# Patient Record
Sex: FEMALE | Race: WHITE | Employment: FULL TIME | URBAN - METROPOLITAN AREA
[De-identification: names, ages, dates, MRNs, and addresses within clinical notes are randomized per-mention and may not be internally consistent; named-entity substitution may affect disease eponyms.]

---

## 2018-10-19 ENCOUNTER — HOSPITAL ENCOUNTER (EMERGENCY)
Age: 63
Discharge: HOME | End: 2018-10-19
Attending: EMERGENCY MEDICINE
Payer: COMMERCIAL

## 2018-10-19 ENCOUNTER — APPOINTMENT (OUTPATIENT)
Dept: CT IMAGING | Age: 63
End: 2018-10-19
Attending: EMERGENCY MEDICINE
Payer: COMMERCIAL

## 2018-10-19 VITALS
WEIGHT: 115.2 LBS | DIASTOLIC BLOOD PRESSURE: 86 MMHG | HEART RATE: 88 BPM | RESPIRATION RATE: 16 BRPM | HEIGHT: 60 IN | OXYGEN SATURATION: 98 % | SYSTOLIC BLOOD PRESSURE: 128 MMHG | TEMPERATURE: 98.1 F | BODY MASS INDEX: 22.62 KG/M2

## 2018-10-19 DIAGNOSIS — M79.602 PAIN OF LEFT UPPER EXTREMITY: ICD-10-CM

## 2018-10-19 DIAGNOSIS — M54.2 NECK PAIN: Primary | ICD-10-CM

## 2018-10-19 DIAGNOSIS — R10.9 SIDE PAIN: ICD-10-CM

## 2018-10-19 LAB
ALBUMIN SERPL-MCNC: 3.8 G/DL (ref 3.5–5)
ALBUMIN/GLOB SERPL: 0.8 {RATIO} (ref 1.1–2.2)
ALP SERPL-CCNC: 92 U/L (ref 45–117)
ALT SERPL-CCNC: 37 U/L (ref 12–78)
ANION GAP SERPL CALC-SCNC: 8 MMOL/L (ref 5–15)
AST SERPL-CCNC: 27 U/L (ref 15–37)
BASOPHILS # BLD: 0 K/UL (ref 0–0.1)
BASOPHILS NFR BLD: 1 % (ref 0–1)
BILIRUB SERPL-MCNC: 0.3 MG/DL (ref 0.2–1)
BUN SERPL-MCNC: 11 MG/DL (ref 6–20)
BUN/CREAT SERPL: 14 (ref 12–20)
CALCIUM SERPL-MCNC: 9 MG/DL (ref 8.5–10.1)
CHLORIDE SERPL-SCNC: 104 MMOL/L (ref 97–108)
CO2 SERPL-SCNC: 27 MMOL/L (ref 21–32)
COMMENT, HOLDF: NORMAL
CREAT SERPL-MCNC: 0.77 MG/DL (ref 0.55–1.02)
DIFFERENTIAL METHOD BLD: ABNORMAL
EOSINOPHIL # BLD: 0.1 K/UL (ref 0–0.4)
EOSINOPHIL NFR BLD: 4 % (ref 0–7)
ERYTHROCYTE [DISTWIDTH] IN BLOOD BY AUTOMATED COUNT: 13.8 % (ref 11.5–14.5)
GLOBULIN SER CALC-MCNC: 5 G/DL (ref 2–4)
GLUCOSE SERPL-MCNC: 118 MG/DL (ref 65–100)
HCT VFR BLD AUTO: 33 % (ref 35–47)
HGB BLD-MCNC: 11.2 G/DL (ref 11.5–16)
IMM GRANULOCYTES # BLD: 0 K/UL (ref 0–0.04)
IMM GRANULOCYTES NFR BLD AUTO: 0 % (ref 0–0.5)
LYMPHOCYTES # BLD: 0.9 K/UL (ref 0.8–3.5)
LYMPHOCYTES NFR BLD: 26 % (ref 12–49)
MCH RBC QN AUTO: 28.7 PG (ref 26–34)
MCHC RBC AUTO-ENTMCNC: 33.9 G/DL (ref 30–36.5)
MCV RBC AUTO: 84.6 FL (ref 80–99)
MONOCYTES # BLD: 0.2 K/UL (ref 0–1)
MONOCYTES NFR BLD: 6 % (ref 5–13)
NEUTS SEG # BLD: 2.3 K/UL (ref 1.8–8)
NEUTS SEG NFR BLD: 63 % (ref 32–75)
NRBC # BLD: 0 K/UL (ref 0–0.01)
NRBC BLD-RTO: 0 PER 100 WBC
PLATELET # BLD AUTO: 151 K/UL (ref 150–400)
PMV BLD AUTO: 11.9 FL (ref 8.9–12.9)
POTASSIUM SERPL-SCNC: 3.9 MMOL/L (ref 3.5–5.1)
PROT SERPL-MCNC: 8.8 G/DL (ref 6.4–8.2)
RBC # BLD AUTO: 3.9 M/UL (ref 3.8–5.2)
SAMPLES BEING HELD,HOLD: NORMAL
SODIUM SERPL-SCNC: 139 MMOL/L (ref 136–145)
TROPONIN I SERPL-MCNC: <0.05 NG/ML
WBC # BLD AUTO: 3.6 K/UL (ref 3.6–11)

## 2018-10-19 PROCEDURE — 71275 CT ANGIOGRAPHY CHEST: CPT

## 2018-10-19 PROCEDURE — 84484 ASSAY OF TROPONIN QUANT: CPT | Performed by: EMERGENCY MEDICINE

## 2018-10-19 PROCEDURE — 74011636320 HC RX REV CODE- 636/320: Performed by: EMERGENCY MEDICINE

## 2018-10-19 PROCEDURE — 99284 EMERGENCY DEPT VISIT MOD MDM: CPT

## 2018-10-19 PROCEDURE — 93005 ELECTROCARDIOGRAM TRACING: CPT

## 2018-10-19 PROCEDURE — 80053 COMPREHEN METABOLIC PANEL: CPT | Performed by: EMERGENCY MEDICINE

## 2018-10-19 PROCEDURE — 36415 COLL VENOUS BLD VENIPUNCTURE: CPT | Performed by: EMERGENCY MEDICINE

## 2018-10-19 PROCEDURE — 74011000258 HC RX REV CODE- 258: Performed by: EMERGENCY MEDICINE

## 2018-10-19 PROCEDURE — 85025 COMPLETE CBC W/AUTO DIFF WBC: CPT | Performed by: EMERGENCY MEDICINE

## 2018-10-19 PROCEDURE — 74011250637 HC RX REV CODE- 250/637: Performed by: EMERGENCY MEDICINE

## 2018-10-19 RX ORDER — DIAZEPAM 5 MG/1
5 TABLET ORAL
Qty: 11 TAB | Refills: 0 | Status: SHIPPED | OUTPATIENT
Start: 2018-10-19 | End: 2019-03-12

## 2018-10-19 RX ORDER — SODIUM CHLORIDE 0.9 % (FLUSH) 0.9 %
10 SYRINGE (ML) INJECTION
Status: COMPLETED | OUTPATIENT
Start: 2018-10-19 | End: 2018-10-19

## 2018-10-19 RX ORDER — DIAZEPAM 5 MG/1
5 TABLET ORAL
Status: COMPLETED | OUTPATIENT
Start: 2018-10-19 | End: 2018-10-19

## 2018-10-19 RX ADMIN — DIAZEPAM 5 MG: 5 TABLET ORAL at 21:32

## 2018-10-19 RX ADMIN — Medication 10 ML: at 21:43

## 2018-10-19 RX ADMIN — SODIUM CHLORIDE 100 ML: 900 INJECTION, SOLUTION INTRAVENOUS at 21:43

## 2018-10-19 RX ADMIN — IOPAMIDOL 100 ML: 755 INJECTION, SOLUTION INTRAVENOUS at 21:43

## 2018-10-19 NOTE — ED TRIAGE NOTES
Triage: Patient arrives ambulatory from home with c/o pain in left arm radiating down to left hip. States it feels like a shooting pain, denies injury. Has been taking muscle relaxers without relief. Patient now adding to her triage that pain is in left side of chest as well and is worse on inspiration.

## 2018-10-20 LAB
ATRIAL RATE: 86 BPM
CALCULATED P AXIS, ECG09: -3 DEGREES
CALCULATED R AXIS, ECG10: 48 DEGREES
CALCULATED T AXIS, ECG11: 44 DEGREES
DIAGNOSIS, 93000: NORMAL
P-R INTERVAL, ECG05: 118 MS
Q-T INTERVAL, ECG07: 374 MS
QRS DURATION, ECG06: 68 MS
QTC CALCULATION (BEZET), ECG08: 447 MS
VENTRICULAR RATE, ECG03: 86 BPM

## 2018-10-20 NOTE — ED PROVIDER NOTES
HPI  
 
  60y F with hx of breast CA here with pain in the L neck, shoulder, L arm, and L back. Started while driving her car. Felt like a muscle spasm. Pain present at rest but worse when movement. Not tender to touch. Took a muscle relaxant at home but it didn't really help. No chest pain. Does have pain in the chest when she takes a deep breath - this reproduces the pain. No hx of similar. No weakness or numbness. No recent illnesses. No injury or trauma. Past Medical History:  
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 %, Ki 8%, HER 2 neg, Oncotype 26, intermediate  Cancer ([facility] [address] 6/13/12 RT. BREAST  Depression  GERD (gastroesophageal reflux disease) OCCASIONAL  
 H/O seasonal allergies  Insomnia  Nausea & vomiting RESPONDED WELL TO ANTIEMETIC PRE-OP  
 Unspecified adverse effect of anesthesia   
 reports that her BP dropped during a previous surgery  Unspecified adverse effect of anesthesia WOKE UP DURING PROCEDURE Past Surgical History:  
Procedure Laterality Date  BREAST SURGERY PROCEDURE UNLISTED  10/2012 [facility]. BREAST EXPANDERS REMOVAL  
 COLONOSCOPY    
 HX BREAST BIOPSY  1998 RIGHT and LEFT breast biopsies [address]  HX GI    
 COLONOSCOPY  
 HX MASTECTOMY  6/2012 Bilateral Mastectomy  HX VENKATESH AND BSO  1991  
 HX TONSILLECTOMY  AGE 9  
 HX VASCULAR ACCESS    
 LT. UPPER CHEST ELIO CATH  
 HX VASCULAR ACCESS  4/2013 ELIO CATH REMOVAL Family History:  
Problem Relation Age of Onset  Hypertension Mother [facility] Arthritis-osteo Mother  Thyroid Disease Mother [facility] Other Mother FIBROCYSTIC BREASTS  Cancer Father 61 LUNG CA - SMOKER Social History Socioeconomic History  Marital status:  Spouse name: Not on file  Number of children: Not on file  Years of education: Not on file  Highest education level: Not on file Social Needs  Financial resource strain: Not on file  Food insecurity - worry: Not on file  Food insecurity - inability: Not on file  Transportation needs - medical: Not on file  Transportation needs - non-medical: Not on file Occupational History  Not on file Tobacco Use  Smoking status: Former Smoker Packs/day: 0.50 Years: 20.00 Pack years: 10.00 Last attempt to quit: 2002 Years since quittin.3  Smokeless tobacco: Never Used Substance and Sexual Activity  Alcohol use: Yes Alcohol/week: 2.0 oz Types: 4 drink(s) per week  Drug use: No  
 Sexual activity: Not on file Other Topics Concern  Not on file Social History Narrative  Not on file ALLERGIES: Codeine; Other medication; Penicillins; and Sulfa (sulfonamide antibiotics) Review of Systems Review of Systems Constitutional: (-) weight loss. HEENT: (-) stiff neck Eyes: (-) discharge. Respiratory: (-) cough. Cardiovascular: (-) syncope. Gastrointestinal: (-) blood in stool. Genitourinary: (-) hematuria. Musculoskeletal: (-) myalgias. Neurological: (-) seizure. Skin: (-) petechiae Lymph/Immunologic: (-) enlarged lymph nodes All other systems reviewed and are negative. Vitals:  
 10/19/18 1951 BP: (!) 154/92 Pulse: 90 Resp: 18 Temp: 98.2 °F (36.8 °C) SpO2: 98% Weight: 52.3 kg (115 lb 3.2 oz) Height: 5' (1.524 m) Physical Exam Nursing note and vitals reviewed. Constitutional: oriented to person, place, and time. appears well-developed and well-nourished. No distress. Head: Normocephalic and atraumatic. Sclera anicteric Nose: No rhinorrhea Mouth/Throat: Oropharynx is clear and moist. Pharynx normal 
Eyes: Conjunctivae are normal. Pupils are equal, round, and reactive to light. Right eye exhibits no discharge. Left eye exhibits no discharge. Neck: Painless normal range of motion. Neck supple. No LAD. Cardiovascular: Normal rate, regular rhythm, normal heart sounds and intact distal pulses. Exam reveals no gallop and no friction rub. No murmur heard. Pulmonary/Chest:  No respiratory distress. No wheezes. No rales. No rhonchi. No increased work of breathing. No accessory muscle use. Good air exchange throughout. Abdominal: soft, non-tender, no rebound or guarding. No hepatosplenomegaly. Normal bowel sounds throughout. Back: no tenderness to palpation, no deformities, no CVA tenderness Extremities/Musculoskeletal: Normal range of motion. no tenderness. No edema. Distal extremities are neurovasc intact. Lymphadenopathy:   No adenopathy. Neurological:  Alert and oriented to person, place, and time. Coordination normal. CN 2-12 intact. Motor and sensory function intact. Skin: Skin is warm and dry. No rash noted. No pallor. MDM 63y F here with pain that feels like muscle spasm in the L neck, arm, and back. Hurts to take a deep breath. Unclear etiology. Sounds muscular. Will try valium and check labs and CT chest for other pathology. Procedures ED EKG interpretation: 
Rhythm: normal sinus rhythm; and regular . Rate (approx.): 86; Axis: normal; P wave: normal; QRS interval: normal ; ST/T wave: normal;  This EKG was interpreted by Gale Davis MD,ED Provider. 10:35 PM 
Workup unremarkable. Still seems muscular as it is worse with any kind of movement. Offered something else for pain, but she doesn't want to take anything that will make her more drowsy. Will dc with rx for valium. Asked her to come back should symptoms worsen or not improve.

## 2018-10-20 NOTE — DISCHARGE INSTRUCTIONS
Learning About Relief for Back Pain  What is back tension and strain? Back strain happens when you overstretch, or pull, a muscle in your back. You may hurt your back in an accident or when you exercise or lift something. Most back pain will get better with rest and time. You can take care of yourself at home to help your back heal.  What can you do first to relieve back pain? When you first feel back pain, try these steps:  · Walk. Take a short walk (10 to 20 minutes) on a level surface (no slopes, hills, or stairs) every 2 to 3 hours. Walk only distances you can manage without pain, especially leg pain. · Relax. Find a comfortable position for rest. Some people are comfortable on the floor or a medium-firm bed with a small pillow under their head and another under their knees. Some people prefer to lie on their side with a pillow between their knees. Don't stay in one position for too long. · Try heat or ice. Try using a heating pad on a low or medium setting, or take a warm shower, for 15 to 20 minutes every 2 to 3 hours. Or you can buy single-use heat wraps that last up to 8 hours. You can also try an ice pack for 10 to 15 minutes every 2 to 3 hours. You can use an ice pack or a bag of frozen vegetables wrapped in a thin towel. There is not strong evidence that either heat or ice will help, but you can try them to see if they help. You may also want to try switching between heat and cold. · Take pain medicine exactly as directed. ¨ If the doctor gave you a prescription medicine for pain, take it as prescribed. ¨ If you are not taking a prescription pain medicine, ask your doctor if you can take an over-the-counter medicine. What else can you do? · Stretch and exercise. Exercises that increase flexibility may relieve your pain and make it easier for your muscles to keep your spine in a good, neutral position. And don't forget to keep walking. · Do self-massage.  You can use self-massage to unwind after work or school or to energize yourself in the morning. You can easily massage your feet, hands, or neck. Self-massage works best if you are in comfortable clothes and are sitting or lying in a comfortable position. Use oil or lotion to massage bare skin. · Reduce stress. Back pain can lead to a vicious [place]: Distress about the pain tenses the muscles in your back, which in turn causes more pain. Learn how to relax your mind and your muscles to lower your stress. Where can you learn more? Go to http://rashard-lino.info/. Enter K057 in the search box to learn more about \"Learning About Relief for Back Pain. \"  Current as of: March 21, 2017  Content Version: 11.5  © 8912-1409 [employer]. Care instructions adapted under license by [employer] (which disclaims liability or warranty for this information). If you have questions about a medical condition or this instruction, always ask your healthcare professional. [place] [address] any warranty or liability for your use of this information. Neck Strain: Care Instructions  Your Care Instructions    You have strained the muscles and ligaments in your neck. A sudden, awkward movement can strain the neck. This often occurs with falls or car accidents or during certain sports. Everyday activities like working on a computer or sleeping can also cause neck strain if they force you to hold your neck in an awkward position for a long time. It is common for neck pain to get worse for a day or two after an injury, but it should start to feel better after that. You may have more pain and stiffness for several days before it gets better. This is expected. It may take a few weeks or longer for it to heal completely. Good home treatment can help you get better faster and avoid future neck problems. Follow-up care is a key part of your treatment and safety.  Be sure to make and go to all appointments, and call your doctor if you are having problems. It's also a good idea to know your test results and keep a list of the medicines you take. How can you care for yourself at home? · If you were given a neck brace (cervical collar) to limit neck motion, wear it as instructed for as many days as your doctor tells you to. Do not wear it longer than you were told to. Wearing a brace for too long can make neck stiffness worse and weaken the neck muscles. · You can try using heat or ice to see if it helps. ? Try using a heating pad on a low or medium setting for 15 to 20 minutes every 2 to 3 hours. Try a warm shower in place of one session with the heating pad. You can also buy single-use heat wraps that last up to 8 hours. ? You can also try an ice pack for 10 to 15 minutes every 2 to 3 hours. · Take pain medicines exactly as directed. ? If the doctor gave you a prescription medicine for pain, take it as prescribed. ? If you are not taking a prescription pain medicine, ask your doctor if you can take an over-the-counter medicine. · Gently rub the area to relieve pain and help with blood flow. Do not massage the area if it hurts to do so. · Do not do anything that makes the pain worse. Take it easy for a couple of days. You can do your usual activities if they do not hurt your neck or put it at risk for more stress or injury. · Try sleeping on a special neck pillow. Place it under your neck, not under your head. Placing a tightly rolled-up towel under your neck while you sleep will also work. If you use a neck pillow or rolled towel, do not use your regular pillow at the same time. · To prevent future neck pain, do exercises to stretch and strengthen your neck and back. Learn how to use good posture, safe lifting techniques, and proper body mechanics. When should you call for help? Call 911 anytime you think you may need emergency care.  For example, call if:    · You are unable to move an arm or a leg at all.   [facility] your doctor now or seek immediate medical care if:    · You have new or worse symptoms in your arms, legs, chest, belly, or buttocks. Symptoms may include:  ? Numbness or tingling. ? Weakness. ? Pain.     · You lose bladder or bowel control.    Watch closely for changes in your health, and be sure to contact your doctor if:    · You are not getting better as expected. Where can you learn more? Go to http://rashard-lino.info/. Enter M253 in the search box to learn more about \"Neck Strain: Care Instructions. \"  Current as of: November 29, 2017  Content Version: 11.8  © 5023-7021 [employer]. Care instructions adapted under license by [employer] (which disclaims liability or warranty for this information). If you have questions about a medical condition or this instruction, always ask your healthcare professional. [address] any warranty or liability for your use of this information.

## 2018-10-20 NOTE — ED NOTES
MD reviewed discharge instructions and options with patient and patient verbalized understanding. RN reviewed discharge instructions using teachback method. Pt ambulated to exit without difficulty and in no signs of acute distress escorted by friend, and she  will drive home. No complaints or needs expressed at this time. Patient was counseled on medications prescribed at discharge. VSS, verbalized relief from most intense pain. Patient to call Dr. Ángela Schuster in the morning for appointment.

## 2019-03-12 ENCOUNTER — APPOINTMENT (OUTPATIENT)
Dept: CT IMAGING | Age: 64
End: 2019-03-12
Attending: EMERGENCY MEDICINE
Payer: COMMERCIAL

## 2019-03-12 ENCOUNTER — HOSPITAL ENCOUNTER (OUTPATIENT)
Age: 64
Setting detail: OBSERVATION
Discharge: HOME | End: 2019-03-13
Attending: EMERGENCY MEDICINE | Admitting: FAMILY MEDICINE
Payer: COMMERCIAL

## 2019-03-12 DIAGNOSIS — R10.9 RIGHT FLANK PAIN: Primary | ICD-10-CM

## 2019-03-12 DIAGNOSIS — M54.50 ACUTE RIGHT-SIDED LOW BACK PAIN WITHOUT SCIATICA: ICD-10-CM

## 2019-03-12 DIAGNOSIS — M84.58XA PATHOLOGICAL FRACTURE OF VERTEBRA DUE TO NEOPLASTIC DISEASE, INITIAL ENCOUNTER: ICD-10-CM

## 2019-03-12 PROBLEM — M54.9 BACK PAIN: Status: ACTIVE | Noted: 2019-03-12

## 2019-03-12 LAB
ANION GAP SERPL CALC-SCNC: 7 MMOL/L (ref 5–15)
APPEARANCE UR: CLEAR
ATRIAL RATE: 93 BPM
BACTERIA URNS QL MICRO: NEGATIVE /HPF
BASOPHILS # BLD: 0 K/UL (ref 0–0.1)
BASOPHILS NFR BLD: 1 % (ref 0–1)
BILIRUB UR QL: NEGATIVE
BUN SERPL-MCNC: 13 MG/DL (ref 6–20)
BUN/CREAT SERPL: 17 (ref 12–20)
CALCIUM SERPL-MCNC: 9.7 MG/DL (ref 8.5–10.1)
CALCULATED P AXIS, ECG09: 35 DEGREES
CALCULATED R AXIS, ECG10: 40 DEGREES
CALCULATED T AXIS, ECG11: 27 DEGREES
CHLORIDE SERPL-SCNC: 102 MMOL/L (ref 97–108)
CO2 SERPL-SCNC: 25 MMOL/L (ref 21–32)
COLOR UR: NORMAL
COMMENT, HOLDF: NORMAL
CREAT SERPL-MCNC: 0.75 MG/DL (ref 0.55–1.02)
DIAGNOSIS, 93000: NORMAL
DIFFERENTIAL METHOD BLD: ABNORMAL
EOSINOPHIL # BLD: 0.1 K/UL (ref 0–0.4)
EOSINOPHIL NFR BLD: 2 % (ref 0–7)
EPITH CASTS URNS QL MICRO: NORMAL /LPF
ERYTHROCYTE [DISTWIDTH] IN BLOOD BY AUTOMATED COUNT: 13.2 % (ref 11.5–14.5)
GLUCOSE SERPL-MCNC: 90 MG/DL (ref 65–100)
GLUCOSE UR STRIP.AUTO-MCNC: NEGATIVE MG/DL
HCT VFR BLD AUTO: 36.2 % (ref 35–47)
HGB BLD-MCNC: 11.9 G/DL (ref 11.5–16)
HGB UR QL STRIP: NEGATIVE
HYALINE CASTS URNS QL MICRO: NORMAL /LPF (ref 0–5)
IMM GRANULOCYTES # BLD AUTO: 0 K/UL (ref 0–0.04)
IMM GRANULOCYTES NFR BLD AUTO: 0 % (ref 0–0.5)
KETONES UR QL STRIP.AUTO: NEGATIVE MG/DL
LEUKOCYTE ESTERASE UR QL STRIP.AUTO: NEGATIVE
LYMPHOCYTES # BLD: 0.7 K/UL (ref 0.8–3.5)
LYMPHOCYTES NFR BLD: 22 % (ref 12–49)
MCH RBC QN AUTO: 27.3 PG (ref 26–34)
MCHC RBC AUTO-ENTMCNC: 32.9 G/DL (ref 30–36.5)
MCV RBC AUTO: 83 FL (ref 80–99)
MONOCYTES # BLD: 0.3 K/UL (ref 0–1)
MONOCYTES NFR BLD: 8 % (ref 5–13)
NEUTS SEG # BLD: 2.1 K/UL (ref 1.8–8)
NEUTS SEG NFR BLD: 67 % (ref 32–75)
NITRITE UR QL STRIP.AUTO: NEGATIVE
NRBC # BLD: 0 K/UL (ref 0–0.01)
NRBC BLD-RTO: 0 PER 100 WBC
P-R INTERVAL, ECG05: 134 MS
PH UR STRIP: 6 [PH] (ref 5–8)
PLATELET # BLD AUTO: 117 K/UL (ref 150–400)
PMV BLD AUTO: 12.9 FL (ref 8.9–12.9)
POTASSIUM SERPL-SCNC: 3.6 MMOL/L (ref 3.5–5.1)
PROT UR STRIP-MCNC: NEGATIVE MG/DL
Q-T INTERVAL, ECG07: 356 MS
QRS DURATION, ECG06: 66 MS
QTC CALCULATION (BEZET), ECG08: 442 MS
RBC # BLD AUTO: 4.36 M/UL (ref 3.8–5.2)
RBC #/AREA URNS HPF: NORMAL /HPF (ref 0–5)
RBC MORPH BLD: ABNORMAL
SAMPLES BEING HELD,HOLD: NORMAL
SODIUM SERPL-SCNC: 134 MMOL/L (ref 136–145)
SP GR UR REFRACTOMETRY: 1.01 (ref 1–1.03)
UA: UC IF INDICATED,UAUC: NORMAL
UROBILINOGEN UR QL STRIP.AUTO: 1 EU/DL (ref 0.2–1)
VENTRICULAR RATE, ECG03: 93 BPM
WBC # BLD AUTO: 3.2 K/UL (ref 3.6–11)
WBC URNS QL MICRO: NORMAL /HPF (ref 0–4)

## 2019-03-12 PROCEDURE — 96361 HYDRATE IV INFUSION ADD-ON: CPT

## 2019-03-12 PROCEDURE — 85025 COMPLETE CBC W/AUTO DIFF WBC: CPT

## 2019-03-12 PROCEDURE — 80048 BASIC METABOLIC PNL TOTAL CA: CPT

## 2019-03-12 PROCEDURE — 36415 COLL VENOUS BLD VENIPUNCTURE: CPT

## 2019-03-12 PROCEDURE — 99218 HC RM OBSERVATION: CPT

## 2019-03-12 PROCEDURE — 74177 CT ABD & PELVIS W/CONTRAST: CPT

## 2019-03-12 PROCEDURE — 74011636320 HC RX REV CODE- 636/320: Performed by: RADIOLOGY

## 2019-03-12 PROCEDURE — 74011250636 HC RX REV CODE- 250/636: Performed by: FAMILY MEDICINE

## 2019-03-12 PROCEDURE — 93005 ELECTROCARDIOGRAM TRACING: CPT

## 2019-03-12 PROCEDURE — 81001 URINALYSIS AUTO W/SCOPE: CPT

## 2019-03-12 PROCEDURE — 99285 EMERGENCY DEPT VISIT HI MDM: CPT

## 2019-03-12 PROCEDURE — 74011250637 HC RX REV CODE- 250/637: Performed by: EMERGENCY MEDICINE

## 2019-03-12 PROCEDURE — 74011250636 HC RX REV CODE- 250/636: Performed by: EMERGENCY MEDICINE

## 2019-03-12 PROCEDURE — 74011250637 HC RX REV CODE- 250/637: Performed by: FAMILY MEDICINE

## 2019-03-12 PROCEDURE — 74011000258 HC RX REV CODE- 258: Performed by: RADIOLOGY

## 2019-03-12 PROCEDURE — 71275 CT ANGIOGRAPHY CHEST: CPT

## 2019-03-12 PROCEDURE — 96374 THER/PROPH/DIAG INJ IV PUSH: CPT

## 2019-03-12 RX ORDER — ONDANSETRON 2 MG/ML
4 INJECTION INTRAMUSCULAR; INTRAVENOUS
Status: DISCONTINUED | OUTPATIENT
Start: 2019-03-12 | End: 2019-03-13 | Stop reason: HOSPADM

## 2019-03-12 RX ORDER — SERTRALINE HYDROCHLORIDE 50 MG/1
50 TABLET, FILM COATED ORAL DAILY
Status: CANCELLED | OUTPATIENT
Start: 2019-03-13

## 2019-03-12 RX ORDER — CYCLOBENZAPRINE HCL 10 MG
10 TABLET ORAL
Status: COMPLETED | OUTPATIENT
Start: 2019-03-12 | End: 2019-03-12

## 2019-03-12 RX ORDER — HEPARIN SODIUM 5000 [USP'U]/ML
5000 INJECTION, SOLUTION INTRAVENOUS; SUBCUTANEOUS EVERY 8 HOURS
Status: DISCONTINUED | OUTPATIENT
Start: 2019-03-12 | End: 2019-03-13 | Stop reason: HOSPADM

## 2019-03-12 RX ORDER — DULOXETIN HYDROCHLORIDE 60 MG/1
60 CAPSULE, DELAYED RELEASE ORAL DAILY
COMMUNITY

## 2019-03-12 RX ORDER — GABAPENTIN 100 MG/1
100 CAPSULE ORAL
Status: DISCONTINUED | OUTPATIENT
Start: 2019-03-13 | End: 2019-03-13 | Stop reason: HOSPADM

## 2019-03-12 RX ORDER — SODIUM CHLORIDE 0.9 % (FLUSH) 0.9 %
5-40 SYRINGE (ML) INJECTION AS NEEDED
Status: DISCONTINUED | OUTPATIENT
Start: 2019-03-12 | End: 2019-03-13 | Stop reason: HOSPADM

## 2019-03-12 RX ORDER — GABAPENTIN 300 MG/1
300 CAPSULE ORAL 2 TIMES DAILY
COMMUNITY

## 2019-03-12 RX ORDER — TRAMADOL HYDROCHLORIDE 50 MG/1
12.5 TABLET ORAL DAILY
COMMUNITY

## 2019-03-12 RX ORDER — OXYCODONE AND ACETAMINOPHEN 5; 325 MG/1; MG/1
1 TABLET ORAL
Status: COMPLETED | OUTPATIENT
Start: 2019-03-12 | End: 2019-03-12

## 2019-03-12 RX ORDER — FAMOTIDINE 20 MG/1
20 TABLET, FILM COATED ORAL 2 TIMES DAILY
Status: CANCELLED | OUTPATIENT
Start: 2019-03-12

## 2019-03-12 RX ORDER — OXYCODONE HYDROCHLORIDE 5 MG/1
5 TABLET ORAL
Status: DISCONTINUED | OUTPATIENT
Start: 2019-03-12 | End: 2019-03-13 | Stop reason: HOSPADM

## 2019-03-12 RX ORDER — GABAPENTIN 100 MG/1
100 CAPSULE ORAL
COMMUNITY

## 2019-03-12 RX ORDER — SODIUM CHLORIDE 0.9 % (FLUSH) 0.9 %
10 SYRINGE (ML) INJECTION
Status: COMPLETED | OUTPATIENT
Start: 2019-03-12 | End: 2019-03-12

## 2019-03-12 RX ORDER — SODIUM CHLORIDE 0.9 % (FLUSH) 0.9 %
5-40 SYRINGE (ML) INJECTION EVERY 8 HOURS
Status: DISCONTINUED | OUTPATIENT
Start: 2019-03-12 | End: 2019-03-13 | Stop reason: HOSPADM

## 2019-03-12 RX ORDER — SODIUM CHLORIDE 9 MG/ML
75 INJECTION, SOLUTION INTRAVENOUS CONTINUOUS
Status: DISCONTINUED | OUTPATIENT
Start: 2019-03-12 | End: 2019-03-13 | Stop reason: HOSPADM

## 2019-03-12 RX ORDER — HYDROMORPHONE HYDROCHLORIDE 2 MG/ML
1 INJECTION, SOLUTION INTRAMUSCULAR; INTRAVENOUS; SUBCUTANEOUS ONCE
Status: COMPLETED | OUTPATIENT
Start: 2019-03-12 | End: 2019-03-12

## 2019-03-12 RX ORDER — DULOXETIN HYDROCHLORIDE 60 MG/1
60 CAPSULE, DELAYED RELEASE ORAL DAILY
Status: DISCONTINUED | OUTPATIENT
Start: 2019-03-13 | End: 2019-03-13 | Stop reason: HOSPADM

## 2019-03-12 RX ORDER — DIAZEPAM 5 MG/1
5 TABLET ORAL
Status: CANCELLED | OUTPATIENT
Start: 2019-03-12

## 2019-03-12 RX ORDER — TRAMADOL HYDROCHLORIDE 50 MG/1
25-50 TABLET ORAL
COMMUNITY

## 2019-03-12 RX ORDER — GABAPENTIN 300 MG/1
300 CAPSULE ORAL 2 TIMES DAILY
Status: DISCONTINUED | OUTPATIENT
Start: 2019-03-12 | End: 2019-03-13 | Stop reason: HOSPADM

## 2019-03-12 RX ORDER — HYDROMORPHONE HYDROCHLORIDE 1 MG/ML
1 INJECTION, SOLUTION INTRAMUSCULAR; INTRAVENOUS; SUBCUTANEOUS
Status: DISCONTINUED | OUTPATIENT
Start: 2019-03-12 | End: 2019-03-13 | Stop reason: HOSPADM

## 2019-03-12 RX ADMIN — Medication 10 ML: at 17:24

## 2019-03-12 RX ADMIN — SODIUM CHLORIDE 100 ML: 900 INJECTION, SOLUTION INTRAVENOUS at 17:24

## 2019-03-12 RX ADMIN — SODIUM CHLORIDE 75 ML/HR: 900 INJECTION, SOLUTION INTRAVENOUS at 22:24

## 2019-03-12 RX ADMIN — GABAPENTIN 300 MG: 300 CAPSULE ORAL at 22:23

## 2019-03-12 RX ADMIN — Medication 10 ML: at 22:24

## 2019-03-12 RX ADMIN — HYDROMORPHONE HYDROCHLORIDE 1 MG: 2 INJECTION, SOLUTION INTRAMUSCULAR; INTRAVENOUS; SUBCUTANEOUS at 18:28

## 2019-03-12 RX ADMIN — CYCLOBENZAPRINE HYDROCHLORIDE 10 MG: 10 TABLET, FILM COATED ORAL at 15:13

## 2019-03-12 RX ADMIN — SODIUM CHLORIDE 1000 ML: 900 INJECTION, SOLUTION INTRAVENOUS at 15:13

## 2019-03-12 RX ADMIN — OXYCODONE HYDROCHLORIDE 5 MG: 5 TABLET ORAL at 22:50

## 2019-03-12 RX ADMIN — OXYCODONE AND ACETAMINOPHEN 1 TABLET: 5; 325 TABLET ORAL at 15:13

## 2019-03-12 RX ADMIN — IOPAMIDOL 100 ML: 755 INJECTION, SOLUTION INTRAVENOUS at 17:24

## 2019-03-12 NOTE — ED NOTES
Verbal shift change report given to Edilma Guillermo 44 (oncoming nurse) by Margie Caballero (offgoing nurse). Report included the following information SBAR and ED Summary.

## 2019-03-12 NOTE — ED PROVIDER NOTES
59 y.o. female with past medical history significant for GERD, Sjogren's disease, anxiety, depression, and breast cancer w/ mets to lung and spine who presents from home with chief complaint of back pain. Pt reports she awakened this morning, walked to her kitchen for coffee, then she began having a severe stabbing pain in her mid R-sided back while returning to her bed. Her pain has been constant since then and she has been unable to stand d/t pain. She rates her current pain as 9/10 noting it is worsened by movement and breathing deeply. Pt also notes she hasn't been able to urinate or pass a BM today because she hasn't been able to stand. She took Tramadol earlier today w/ mild relief. Pt reports hx of breast cancer w/ mets to her lungs and T12, L4 spine. She is scheduled for a biopsy in 2 days. Pt denies injury, fever, and chills. There are no other acute medical concerns at this time. PCP: Anna Marie Ovalles MD    Note written by Debbie Rodriguez, as dictated by Lenora Beach MD 2:55 PM             Past Medical History:   Diagnosis Date    Anxiety     Breast CA ([facility] [address] 2012 BILATERAL mastectomy/reconstruction for RIGHT mixed ductal/lobular, grade 2, 4.5cm mixed ductal and lobular carcinoma, +1/4 nodes, %. %, Ki 8%, HER 2 neg, Oncotype 26, intermediate     Cancer ([facility] [address] 12    RT. BREAST    Depression     GERD (gastroesophageal reflux disease)     OCCASIONAL    H/O seasonal allergies     Insomnia     Nausea & vomiting     RESPONDED WELL TO ANTIEMETIC PRE-OP    Unspecified adverse effect of anesthesia     reports that her BP dropped during a previous surgery    Unspecified adverse effect of anesthesia     WOKE UP DURING PROCEDURE       Past Surgical History:   Procedure Laterality Date    BREAST SURGERY PROCEDURE UNLISTED  10/2012    [facility].  BREAST EXPANDERS REMOVAL    COLONOSCOPY      HX BREAST BIOPSY      RIGHT and LEFT breast biopsies    HX  SECTION 1979    HX GI      COLONOSCOPY    HX MASTECTOMY  2012    Bilateral Mastectomy    HX VENKATESH AND BSO      HX TONSILLECTOMY  AGE 9    HX VASCULAR ACCESS      LT. UPPER CHEST ELIO CATH    HX VASCULAR ACCESS  2013    ELIO CATH REMOVAL         Family History:   Problem Relation Age of Onset    Hypertension Mother     Arthritis-osteo Mother     Thyroid Disease Mother     Other Mother         FIBROCYSTIC BREASTS    Cancer Father 61        LUNG CA - SMOKER       Social History     Socioeconomic History    Marital status:      Spouse name: Not on file    Number of children: Not on file    Years of education: Not on file    Highest education level: Not on file   Social Needs    Financial resource strain: Not on file    Food insecurity - worry: Not on file    Food insecurity - inability: Not on file   [employer] needs - medical: Not on file   [employer] needs - non-medical: Not on file   Occupational History    Not on file   Tobacco Use    Smoking status: Former Smoker     Packs/day: 0.50     Years: 20.00     Pack years: 10.00     Last attempt to quit: 2002     Years since quittin.7    Smokeless tobacco: Never Used   Substance and Sexual Activity    Alcohol use: Yes     Alcohol/week: 2.0 oz     Types: 4 drink(s) per week    Drug use: No    Sexual activity: Not on file   Other Topics Concern    Not on file   Social History Narrative    Not on file         ALLERGIES: Codeine; Egg derived; Other medication; Penicillins; and Sulfa (sulfonamide antibiotics)    Review of Systems   Constitutional: Negative for chills and fever. Respiratory: Negative for shortness of breath. Cardiovascular: Negative for chest pain. Gastrointestinal: Negative for abdominal pain, constipation, diarrhea and vomiting. Musculoskeletal: Positive for back pain and gait problem. Neurological: Negative for dizziness and light-headedness.    All other systems reviewed and are negative. Vitals:    03/12/19 1336 03/12/19 1414   BP:  103/72   Pulse: 92 (!) 108   Resp:  18   Temp:  99.2 °F (37.3 °C)   SpO2: 98% 98%   Weight:  47.6 kg (105 lb)            Physical Exam   Constitutional: She is oriented to person, place, and time. She appears well-developed and well-nourished. HENT:   Head: Normocephalic and atraumatic. Eyes: Pupils are equal, round, and reactive to light. Neck: Normal range of motion. Neck supple. Cardiovascular: Normal rate and regular rhythm. Pulmonary/Chest: Effort normal and breath sounds normal.   Abdominal: Soft. She exhibits no distension. There is tenderness. Mild suprapubic tenderness (likely secondary to full bladder). Musculoskeletal:   No spinal tenderness. Neurological: She is alert and oriented to person, place, and time. Skin: Skin is warm and dry. Capillary refill takes less than 2 seconds. Annular erythema present. Psychiatric: She has a normal mood and affect. Her behavior is normal.   Nursing note and vitals reviewed. Note written by Debbie Bradley, as dictated by Alivia Aly MD 2:55 PM    MDM       Procedures      ED EKG interpretation:  Rhythm: normal sinus rhythm; and regular . Rate (approx.): 93; Axis: normal; ST/T wave: normal;   Note written by Debbie Bradley, as dictated by Alivia Aly MD 3:15 PM         PROGRESS NOTE:  6:36 PM  Pt has new mets to her liver. PROGRESS NOTE:  7:48 PM  Pt was unable to ambulate. Alivia Aly MD plans to admit Pt. Hospitalist Nathalie for Admission  7:50 PM    ED Room Number: ER21/21  Patient Name and age:  Anna Dennis 59 y.o.  female  Working Diagnosis:   1. Right flank pain    2.  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

## 2019-03-12 NOTE — ED NOTES
Patient returned from [address]. No sign of distress noted. 5:55 PM  Patient assisted onto bedpan. Patient requesting more pain medication, Odalys Hudson MD notified.

## 2019-03-12 NOTE — ED NOTES
Patient up with this RN to ambulate using walker. Patient reports increasing pain and dizziness from ambulation and only able to ambulate to door frame. Patient returned to [facility] Ace Car MD notified and at bedside to update patient on plan of care. Patient's Brother remains at bedside.

## 2019-03-12 NOTE — ED TRIAGE NOTES
TRIAGE: Pt arrives with c/o right sided back/flank pain below rib cage that started this morning when she woke up. Pt reports pain is a \"stabbing feeling and she hasn't been able to walk. \" Denies injury or urinary symptoms. Patient reports she took a tramadol this morning with some relief but pain has since started to return. Hx of breast cancer that patient reports \"has recently spread to lungs and possibly bone. \"

## 2019-03-13 VITALS
WEIGHT: 105 LBS | SYSTOLIC BLOOD PRESSURE: 116 MMHG | RESPIRATION RATE: 16 BRPM | OXYGEN SATURATION: 96 % | BODY MASS INDEX: 20.51 KG/M2 | DIASTOLIC BLOOD PRESSURE: 75 MMHG | HEART RATE: 95 BPM | TEMPERATURE: 98.3 F

## 2019-03-13 LAB
ANION GAP SERPL CALC-SCNC: 7 MMOL/L (ref 5–15)
BASOPHILS # BLD: 0 K/UL (ref 0–0.1)
BASOPHILS NFR BLD: 1 % (ref 0–1)
BUN SERPL-MCNC: 10 MG/DL (ref 6–20)
BUN/CREAT SERPL: 14 (ref 12–20)
CALCIUM SERPL-MCNC: 9.2 MG/DL (ref 8.5–10.1)
CHLORIDE SERPL-SCNC: 107 MMOL/L (ref 97–108)
CO2 SERPL-SCNC: 26 MMOL/L (ref 21–32)
CREAT SERPL-MCNC: 0.7 MG/DL (ref 0.55–1.02)
DIFFERENTIAL METHOD BLD: ABNORMAL
EOSINOPHIL # BLD: 0 K/UL (ref 0–0.4)
EOSINOPHIL NFR BLD: 2 % (ref 0–7)
ERYTHROCYTE [DISTWIDTH] IN BLOOD BY AUTOMATED COUNT: 13.1 % (ref 11.5–14.5)
GLUCOSE SERPL-MCNC: 97 MG/DL (ref 65–100)
HCT VFR BLD AUTO: 33.9 % (ref 35–47)
HGB BLD-MCNC: 10.8 G/DL (ref 11.5–16)
IMM GRANULOCYTES # BLD AUTO: 0 K/UL (ref 0–0.04)
IMM GRANULOCYTES NFR BLD AUTO: 0 % (ref 0–0.5)
LYMPHOCYTES # BLD: 0.7 K/UL (ref 0.8–3.5)
LYMPHOCYTES NFR BLD: 26 % (ref 12–49)
MCH RBC QN AUTO: 26.9 PG (ref 26–34)
MCHC RBC AUTO-ENTMCNC: 31.9 G/DL (ref 30–36.5)
MCV RBC AUTO: 84.5 FL (ref 80–99)
MONOCYTES # BLD: 0.2 K/UL (ref 0–1)
MONOCYTES NFR BLD: 8 % (ref 5–13)
NEUTS SEG # BLD: 1.6 K/UL (ref 1.8–8)
NEUTS SEG NFR BLD: 63 % (ref 32–75)
NRBC # BLD: 0 K/UL (ref 0–0.01)
NRBC BLD-RTO: 0 PER 100 WBC
PLATELET # BLD AUTO: 103 K/UL (ref 150–400)
PMV BLD AUTO: 12.1 FL (ref 8.9–12.9)
POTASSIUM SERPL-SCNC: 3.8 MMOL/L (ref 3.5–5.1)
RBC # BLD AUTO: 4.01 M/UL (ref 3.8–5.2)
SODIUM SERPL-SCNC: 140 MMOL/L (ref 136–145)
WBC # BLD AUTO: 2.6 K/UL (ref 3.6–11)

## 2019-03-13 PROCEDURE — 99218 HC RM OBSERVATION: CPT

## 2019-03-13 PROCEDURE — 74011250637 HC RX REV CODE- 250/637: Performed by: FAMILY MEDICINE

## 2019-03-13 PROCEDURE — 74011250637 HC RX REV CODE- 250/637: Performed by: HOSPITALIST

## 2019-03-13 PROCEDURE — 36415 COLL VENOUS BLD VENIPUNCTURE: CPT

## 2019-03-13 PROCEDURE — 85025 COMPLETE CBC W/AUTO DIFF WBC: CPT

## 2019-03-13 PROCEDURE — 80048 BASIC METABOLIC PNL TOTAL CA: CPT

## 2019-03-13 PROCEDURE — 97161 PT EVAL LOW COMPLEX 20 MIN: CPT

## 2019-03-13 PROCEDURE — 94760 N-INVAS EAR/PLS OXIMETRY 1: CPT

## 2019-03-13 PROCEDURE — 97530 THERAPEUTIC ACTIVITIES: CPT

## 2019-03-13 PROCEDURE — 97116 GAIT TRAINING THERAPY: CPT

## 2019-03-13 RX ORDER — CYCLOBENZAPRINE HCL 10 MG
10 TABLET ORAL ONCE
Status: COMPLETED | OUTPATIENT
Start: 2019-03-13 | End: 2019-03-13

## 2019-03-13 RX ORDER — HYDROCODONE BITARTRATE AND ACETAMINOPHEN 5; 325 MG/1; MG/1
1 TABLET ORAL
Qty: 10 TAB | Refills: 0 | Status: SHIPPED | OUTPATIENT
Start: 2019-03-13 | End: 2019-03-16

## 2019-03-13 RX ORDER — BACLOFEN 5 MG/1
5 TABLET ORAL
Qty: 10 TAB | Refills: 0 | Status: SHIPPED | OUTPATIENT
Start: 2019-03-13

## 2019-03-13 RX ADMIN — GABAPENTIN 100 MG: 100 CAPSULE ORAL at 11:33

## 2019-03-13 RX ADMIN — DULOXETINE HYDROCHLORIDE 60 MG: 60 CAPSULE, DELAYED RELEASE ORAL at 08:45

## 2019-03-13 RX ADMIN — Medication 10 ML: at 05:09

## 2019-03-13 RX ADMIN — GABAPENTIN 300 MG: 300 CAPSULE ORAL at 08:44

## 2019-03-13 RX ADMIN — CYCLOBENZAPRINE HYDROCHLORIDE 10 MG: 10 TABLET, FILM COATED ORAL at 05:09

## 2019-03-13 NOTE — PROGRESS NOTES
physical Therapy EVALUATION/DISCHARGE  Patient: Isaias Shahid (88 y.o. female)  Date: 3/13/2019  Primary Diagnosis: Back pain [M54.9]       Precautions:   Fall  ASSESSMENT :  Based on the objective data described below, the patient demonstrated independence with log roll transfer OOB, sit <> stand transfers, and ambulation x 180 feet. Pt ambulated with slow, guarded pace with steady gait. Pt cleared on stairs. Pt demonstrated safe technique ascending/descending one step at a time while using one hand rail with CGA. Pt reports an improvement with decreased pain and increased tolerance for ambulation. Pt educated on back precautions and strategies to decrease pain and stress on her spine. Pt is cleared for discharge from a PT standpoint. No further services are indicated at this time. PLAN :  Discharge Recommendations: None  Further Equipment Recommendations for Discharge: none     SUBJECTIVE:   Patient stated I am 100% better.   Reviewed back precautions and strategies to decrease pain and stress on her back. OBJECTIVE DATA SUMMARY:   HISTORY:    Past Medical History:   Diagnosis Date    Anxiety     Breast CA ([facility] [address] [facility] 5/17/2012 6/12 BILATERAL mastectomy/reconstruction for RIGHT mixed ductal/lobular, grade 2, 4.5cm mixed ductal and lobular carcinoma, +1/4 nodes, %. %, Ki 8%, HER 2 neg, Oncotype 26, intermediate     Cancer ([address] 6/13/12    RT. BREAST    Depression     GERD (gastroesophageal reflux disease)     OCCASIONAL    H/O seasonal allergies     Insomnia     Nausea & vomiting     RESPONDED WELL TO ANTIEMETIC PRE-OP    Unspecified adverse effect of anesthesia     reports that her BP dropped during a previous surgery    Unspecified adverse effect of anesthesia     WOKE UP DURING PROCEDURE     Past Surgical History:   Procedure Laterality Date    BREAST SURGERY PROCEDURE UNLISTED  10/2012    ALEKS.  BREAST EXPANDERS REMOVAL    COLONOSCOPY      HX BREAST BIOPSY  1998    RIGHT and LEFT breast biopsies    HX  SECTION      HX GI      COLONOSCOPY    HX MASTECTOMY  2012    Bilateral Mastectomy    HX VENKATESH AND BSO      HX TONSILLECTOMY  AGE 9    HX VASCULAR ACCESS      LT. UPPER CHEST ELIO CATH    HX VASCULAR ACCESS  2013    ELIO CATH REMOVAL     Prior Level of Function/Home Situation: ambulates independently, no falls in the last year  Personal factors and/or comorbidities impacting plan of care: medical history, CA    Home Situation  Home Environment: Private residence  # Steps to Enter: 5  Rails to Enter: Yes  Hand Rails : Bilateral  One/Two Story Residence: Two story  # of Interior Steps: 13  Interior Rails: Right  Living Alone: Yes  Support Systems: Family member(s), Friends \ neighbors  Patient Expects to be Discharged to[de-identified] Private residence  Current DME Used/Available at Home: Ofilia Jewel, straight, Walker, rolling, Shower chair  Tub or Shower Type: Shower    EXAMINATION/PRESENTATION/DECISION MAKING:   Critical Behavior:  Neurologic State: Alert, Appropriate for age  Orientation Level: Oriented X4  Cognition: Appropriate decision making, Appropriate for age attention/concentration, Appropriate safety awareness  Safety/Judgement: Good awareness of safety precautions, Home safety, Insight into deficits  Hearing: Auditory  Auditory Impairment: None    Skin:  Rash on skin due to Sjogren's disease, pt reports she has been having a flare with her Sjogren's disease for a few weeks    Range Of Motion:  AROM: Generally decreased, functional, limited due to back pain                       Strength:    Strength: Generally decreased, functional, limited due to back pain                     Tone & Sensation:   Tone: Normal              Sensation: Intact               Coordination:   intact       Functional Mobility:  Bed Mobility:  Rolling: Independent; Additional time  Supine to Sit: Independent; Additional time   Pt reports she uses log roll technique normally at home Transfers:  Sit to Stand: Independent  Stand to Sit: Independent                       Balance:   Sitting: Intact  Standing: Intact  Ambulation/Gait Training:  Distance (ft): 180 Feet (ft)  Assistive Device: Gait belt  Ambulation - Level of Assistance: Independent        Gait Abnormalities: Antalgic, guarded due to back pain, generally slow and steady              Speed/Kalina: Slow  Step Length: Left shortened;Right shortened                    Stairs:  Number of Stairs Trained: 5, limited due to IV pole, pt should be able to tolerate full flight of stairs  Stairs - Level of Assistance: Contact guard assistance   Rail Use: Right          Functional Measure:  Tinetti test:    Sitting Balance: 1  Arises: 2  Attempts to Rise: 2  Immediate Standing Balance: 2  Standing Balance: 2  Nudged: 2  Eyes Closed: 1  Turn 360 Degrees - Continuous/Discontinuous: 1  Turn 360 Degrees - Steady/Unsteady: 1  Sitting Down: 2  Balance Score: 16  Indication of Gait: 1  R Step Length/Height: 1  L Step Length/Height: 1  R Foot Clearance: 1  L Foot Clearance: 1  Step Symmetry: 1  Step Continuity: 1  Path: 2  Trunk: 2  Walking Time: 1  Gait Score: 12  Total Score: 28         Tinetti Tool Score Risk of Falls  <19 = High Fall Risk  19-24 = Moderate Fall Risk  25-28 = Low Fall Risk  Tinetti ME. Performance-Oriented Assessment of Mobility Problems in Elderly Patients. [place] 66; [id number].  (Scoring Description: PT Bulletin Feb. 10, 1993)    Older adults: Twan Coker et al, 2009; n = 1000 [facility] elderly evaluated with ABC, MIKE, ADL, and IADL)  · Mean MIKE score for males aged 69-68 years = 26.21(3.40)  · Mean MIKE score for females age 69-68 years = 25.16(4.30)  · Mean MIKE score for males over 80 years = 23.29(6.02)  · Mean MIKE score for females over 80 years = 17.20(8.32)            Physical Therapy Evaluation Charge Determination   History Examination Presentation Decision-Making   MEDIUM  Complexity : 1-2 comorbidities / personal factors will impact the outcome/ POC  LOW Complexity : 1-2 Standardized tests and measures addressing body structure, function, activity limitation and / or participation in recreation  MEDIUM Complexity : Evolving with changing characteristics  LOW Complexity : FOTO score of       Based on the above components, the patient evaluation is determined to be of the following complexity level: LOW     Pain:  Pain Scale 1: Numeric (0 - 10)  Pain Intensity 1: 5  Pain Location 1: Back  Activity Tolerance:   Good, pt reports an improvement in her pain, tolerated ambulation x 180 feet and 5 steps     After treatment:   [x]   Patient left in no apparent distress sitting up in chair  []   Patient left in no apparent distress in bed  [x]   Call bell left within reach  [x]   Nursing notified  []   Caregiver present  []   Bed alarm activated    COMMUNICATION/EDUCATION:   Communication/Collaboration:  [x]   Fall prevention education was provided and the patient/caregiver indicated understanding. [x]   Patient/family have participated as able and agree with findings and recommendations. []   Patient is unable to participate in plan of care at this time.   Findings and recommendations were discussed with: Registered Nurse    Thank you for this referral.  Lina Irwin   Time Calculation: 23 mins

## 2019-03-13 NOTE — DISCHARGE INSTRUCTIONS
Discharge Instructions       PATIENT ID: Laura Loomis  MRN: [id number]   YOB: 1955    DATE OF ADMISSION: 3/12/2019  2:18 PM    DATE OF DISCHARGE: 3/13/2019    PRIMARY CARE PROVIDER: Trevor Mora MD     ATTENDING PHYSICIAN: Merry Brower DO  DISCHARGING PROVIDER: Lilly Saini DO    To contact this individual call [phone] and ask the  to page. If unavailable ask to be transferred the Adult Hospitalist Department. DISCHARGE DIAGNOSES     Metastatic breast cancer with pain    CONSULTATIONS: IP CONSULT TO HOSPITALIST    PROCEDURES/SURGERIES: * No surgery found *    PENDING TEST RESULTS:   At the time of discharge the following test results are still pending: none    FOLLOW UP APPOINTMENTS:   Follow-up Information     Follow up With Specialties Details Why Enio Guzman MD Hematology and Oncology, Hematology, Oncology Schedule an appointment as soon as possible for a visit in 3 days  [facility] 122 112 [address]      Trevor Mora MD [address] [phone] RECOMMENDATIONS:     Follow up with oncology. Okay for planned outpatient procedure 3/14/2019    Prescription given for baclofen (muscle relaxer) and norco (pain medication). Use only as needed. Discuss with oncologist for possible prescription in the future. DIET: Regular Diet    ACTIVITY: Activity as tolerated    WOUND CARE: none    EQUIPMENT needed: none      DISCHARGE MEDICATIONS:   See Medication Reconciliation Form    · It is important that you take the medication exactly as they are prescribed. · Keep your medication in the bottles provided by the pharmacist and keep a list of the medication names, dosages, and times to be taken in your wallet. · Do not take other medications without consulting your doctor.        NOTIFY YOUR PHYSICIAN FOR ANY OF THE FOLLOWING:   Fever over 101 degrees for 24 hours. Chest pain, shortness of breath, fever, chills, nausea, vomiting, diarrhea, change in mentation, falling, weakness, bleeding. Severe pain or pain not relieved by medications. Or, any other signs or symptoms that you may have questions about.       DISPOSITION:   x Home With:   OT  PT  HH  RN       SNF/Inpatient Rehab/LTAC    Independent/assisted living    Hospice    Other:     CDMP Checked:   Yes x     PROBLEM LIST Updated:  Yes x       Signed:   [id number] [facility],   3/13/2019  11:58 AM

## 2019-03-13 NOTE — PROGRESS NOTES
I have reviewed discharge instructions with the patient. The patient verbalized understanding. IV removed. Patient leaving with discharge instructions, belongings, and prescriptions. No further questions at this time.

## 2019-03-13 NOTE — ED NOTES
TRANSFER - OUT REPORT:    Verbal report given to laury rn  name) on Marry Amor  being transferred to  (unit) for routine progression of care       Report consisted of patients Situation, Background, Assessment and   Recommendations(SBAR). Information from the following report(s) SBAR, ED Summary and Recent Results was reviewed with the receiving nurse. Lines:   Peripheral IV 03/12/19 Left Antecubital (Active)   Site Assessment Clean, dry, & intact 3/12/2019  3:50 PM   Phlebitis Assessment 0 3/12/2019  3:50 PM   Infiltration Assessment 0 3/12/2019  3:50 PM        Opportunity for questions and clarification was provided.       Patient transported with:   [employer]

## 2019-03-13 NOTE — H&P
[address]  HISTORY AND PHYSICAL    Name:  Jenn Lemus  MR#:  [id number]  :  1955  ACCOUNT #:  [de-identified]  ADMIT DATE:  2019      CHIEF COMPLAINT:  Back pain. HISTORY OF PRESENT ILLNESS:  The patient is a 62-year-old woman with past medical history of GERD, Sjogren's disease, anxiety, depression, and breast cancer with mets to the lung and spine, who presents to the hospital with back pain. The patient reports she has had some back pain in the past, but woke up this morning, went to get some coffee, and post that started having excruciating back pain. The patient reports she was not able to walk and pain radiated to the right side upon her back. The patient got concerned, reports that it was hard for her to function and decided to come to the hospital.  In the hospital, the patient had a CT scan which showed significant lytic lesions and was requested to be observed for pain control. The patient reports that she has a history of breast cancer with mets to her lungs and T12-L4 spine. Reports that she is scheduled for a biopsy in two days. The patient denies any other complaints or problems. Denies any bladder or bowel incontinence. Denies any tingling or numbness in her legs. Denies any headache, blurry vision, sore throat, trouble swallowing, trouble with speech, chest pain, shortness of breath, cough, fevers, chills, abdominal pain, constipation, diarrhea, urinary symptoms, any focal or neurological deficits, any falls, injuries, hematemesis, melena, hemoptysis, hematuria, or any other concerns or problems. PAST MEDICAL HISTORY:  See above. HOME MEDICATIONS:  Currently the patient is on,  1. Cymbalta 60 mg daily. 2.  Gabapentin 300 mg b.i.d.  3.  _____  mg daily. 4.  Tramadol 12.5 mg daily and 25 to 50 mg nightly. 5.  Cholecalciferol as needed. SOCIAL HISTORY:  Former smoker, used to smoke half a pack per day for 20 years, quit in .   Denies IV drug abuse. Occasional alcohol use. Lives at home. The patient ambulates without any assistance. ALLERGIES:  CAFFEINE, CODEINE, EGG-DERIVED PRODUCTS, PENICILLIN, AND SULFA. FAMILY HISTORY:  Mother with history of hypertension, osteoarthritis, thyroid disease, and fibrocystic breast disease. REVIEW OF SYSTEMS:  All systems were reviewed and found to be essentially negative except for the symptoms as mentioned above. PHYSICAL EXAMINATION:  GENERAL:  Alert x3, awake, moderately distressed, pleasant female, appears to be stated age. VITAL SIGNS:  Temperature 97.1, pulse 95, respiratory rate 18, blood pressure 118/74, pulse ox 96% on room air. HEENT:  Pupils are equal and reactive to light. Dry mucous membranes. Tympanic membranes are clear. NECK:  Supple. CHEST:  Clear to auscultation bilaterally. COR:  S1 and S2 heard. ABDOMEN:  Soft, nontender, and nondistended. Bowel sounds were physiological.  EXTREMITIES:  No clubbing, no cyanosis, no edema. NEURO/PSYCH:  Pleasant mood and affect. Cranial nerves II through XII grossly intact. Sensory grossly within normal limits. DTRs 2+/4. Strength 5/5. Lower extremity could not be tested well because of back pain. SKIN:  The patient has Sjogren's and has a chronic rash associated with the same. LABORATORY DATA:  White count 3.2, hemoglobin 11.9, hematocrit 36.2, platelets 486. Sodium 134, potassium 3.6, chloride 102, bicarb 25, anion gap 7, glucose 90, BUN 13, creatinine 0.75, calcium 9.7. CT of the chest and abdomen shows no pulmonary emboli, mild emphysematous changes, metastatic deposit throughout the bony skeleton including thoracic spine. Abdomen shows metastatic deposits throughout lumbar spine, likely related to back pain. Diverticulosis without diverticulitis. Metastatic deposits are suspected in the liver, where  two new hypodense poorly enhancing lesions are incidentally noted to equilibrate on delayed imaging.  PET scanning may be helpful in confirming malignancy. Pelvis: Bony metastatic disease. ASSESSMENT AND PLAN:  1. Back pain, most likely secondary to the metastatic disease. The patient will be admitted on the telemetry bed. We will provide IV Dilaudid along with p.r.n. oxycodone for pain control. We will get PT consult as the patient was unable to ambulate in the ER, and further intervention per hospital course. We will provide neurovascular checks and may consider getting Palliative Care in the morning. Further intervention per hospital course. Reassess as needed. The patient on fall precautions. 2.  History of breast cancer with metastatic disease, see above, pain control, we will get Oncology consult in the morning. Further intervention per hospital course. Reassess as needed. 3.  Gastrointestinal/deep vein thrombosis prophylaxis. The patient will be on _____.         Jacob Cooper MD      [id number]  D:  03/12/2019 20:59  T:  03/13/2019 7:56  JOB #:  [id number]

## 2019-03-13 NOTE — PROGRESS NOTES
Admission Medication Reconciliation:    Information obtained from:  patient, RxQuery    Comments/Recommendations:     Spoke with patient regarding Cesilia Villasenor's medications. She was able to confirm drug names, doses, and frequencies of all medications. The following changes were made to the PTA medication list:  1. Removed diazepam, famotidine, letrozole, and sertraline  2. Added duloxetine, gabapentin x2, tramadol x2, vitamin D    Allergies and reactions were verified with the patient. Patient's pharmacy: [facility]       Allergies:  Codeine; Egg derived; Other medication; Penicillins; and Sulfa (sulfonamide antibiotics)    Chief Complaint for this Admission:    Chief Complaint   Patient presents with    Flank Pain    Back Pain       Significant PMH/Disease States:   Past Medical History:   Diagnosis Date    Anxiety     [facility] 6/12 BILATERAL mastectomy/reconstruction for RIGHT mixed ductal/lobular, grade 2, 4.5cm mixed ductal and lobular carcinoma, +1/4 nodes, %. %, Ki 8%, HER 2 neg, Oncotype 26, intermediate     Cancer ([facility] [address] 6/13/12    RT. BREAST    Depression     GERD (gastroesophageal reflux disease)     OCCASIONAL    H/O seasonal allergies     Insomnia     Nausea & vomiting     RESPONDED WELL TO ANTIEMETIC PRE-OP    Unspecified adverse effect of anesthesia     reports that her BP dropped during a previous surgery    Unspecified adverse effect of anesthesia     WOKE UP DURING PROCEDURE       Prior to Admission Medications:   Prior to Admission Medications   Prescriptions Last Dose Informant Patient Reported? Taking? DULoxetine (CYMBALTA) 60 mg capsule 3/12/2019 at Unknown time  Yes Yes   Sig: Take 60 mg by mouth daily. cholecalciferol, vitamin D3, (VITAMIN D3 PO)   Yes Yes   Sig: Take  by mouth daily. gabapentin (NEURONTIN) 100 mg capsule   Yes Yes   Sig: Take 100 mg by mouth daily (with lunch).    gabapentin (NEURONTIN) 300 mg capsule   Yes Yes Sig: Take 300 mg by mouth two (2) times a day. Breakfast/bedtime   traMADol (ULTRAM) 50 mg tablet   Yes Yes   Sig: Take 12.5 mg by mouth daily. Patient cuts tablet and takes 1/4 tab each morning. traMADol (ULTRAM) 50 mg tablet   Yes Yes   Sig: Take 25-50 mg by mouth nightly. Facility-Administered Medications: None       Thank you for allowing me to participate in this patient's care. Please call the main pharmacy at  or the [facility] pharmacist at  with any questions.     Puja Jonas, PharmD

## 2019-03-13 NOTE — DISCHARGE SUMMARY
Discharge Summary       PATIENT ID: Dustin Borges  MRN: [id number]   YOB: 1955    DATE OF ADMISSION: 3/12/2019  2:18 PM    DATE OF DISCHARGE: 3/13/2019  PRIMARY CARE PROVIDER: Efraín Rangel MD     ATTENDING PHYSICIAN: Evelyn Mason DO   DISCHARGING PROVIDER: Evelyn Mason DO    To contact this individual call [phone] and ask the  to page. If unavailable ask to be transferred the Adult Hospitalist Department. CONSULTATIONS: IP CONSULT TO HOSPITALIST    PROCEDURES/SURGERIES: * No surgery found *    ADMITTING DIAGNOSES & HOSPITAL COURSE:     59year old female with past medical history metastatic breast cancer to liver and bone, presenting to [facility] with acute back pain. CT found evidence of metastasis, no acute abnormalities. Labs unremarkable. Patient improved with increased pain medication. She was stable to discharge from to the hospital with oncology follow up. CT chest/abdomen/pelvis:  IMPRESSION:  1. CHEST: No pulmonary embolus. Mild emphysematous change. Metastatic deposits  throughout the bony skeleton, including the thoracic spine. 2..ABDOMEN: Metastatic deposits throughout the lumbar spine, likely related to  back pain. Diverticulosis without diverticulitis. Metastatic deposits are  suspected in the liver, where 2 new hypodense poorly enhancing lesions are  incidentally noted to equilibrate on delayed imaging. PET scanning may be  helpful in confirming malignancy. 3. PELVIS: Bony metastatic disease. DISCHARGE DIAGNOSES / PLAN:      Back pain:  -suspect related to bone metastasis  -resolved with pain medication  -given prescription for oral pain medication and muscle relaxer    Breast cancer with metastasis to bone and liver  -follows at [address] [place] [address]  -plans for outpatient bone biopsy tomorrow, 3/14      ADDITIONAL CARE RECOMMENDATIONS:     Follow up with oncology.      Okay for planned outpatient procedure 3/14/2019    Prescription given for baclofen (muscle relaxer) and norco (pain medication). Use only as needed. Discuss with oncologist for possible prescription in the future. PENDING TEST RESULTS:   At the time of discharge the following test results are still pending: none    FOLLOW UP APPOINTMENTS:    Follow-up Information     Follow up With Specialties Details Why Su Cabezas MD Hematology and Oncology, Hematology, Oncology Schedule an appointment as soon as possible for a visit in 3 days  [facility] [address]      Hardy Oakley MD [facility] [address] Drummond [phone]               DIET: Regular Diet    ACTIVITY: Activity as tolerated    WOUND CARE: none    EQUIPMENT needed: none      DISCHARGE MEDICATIONS:  Current Discharge Medication List      START taking these medications    Details   baclofen 5 mg tab Take 5 mg by mouth three (3) times daily as needed (muscle spasm). Qty: 10 Tab, Refills: 0      HYDROcodone-acetaminophen (NORCO) 5-325 mg per tablet Take 1 Tab by mouth every six (6) hours as needed for Pain for up to 3 days. Max Daily Amount: 4 Tabs. Qty: 10 Tab, Refills: 0    Associated Diagnoses: Acute right-sided low back pain without sciatica         CONTINUE these medications which have NOT CHANGED    Details   DULoxetine (CYMBALTA) 60 mg capsule Take 60 mg by mouth daily. !! gabapentin (NEURONTIN) 300 mg capsule Take 300 mg by mouth two (2) times a day. Breakfast/bedtime      !! gabapentin (NEURONTIN) 100 mg capsule Take 100 mg by mouth daily (with lunch). !! traMADol (ULTRAM) 50 mg tablet Take 12.5 mg by mouth daily. Patient cuts tablet and takes 1/4 tab each morning. !! traMADol (ULTRAM) 50 mg tablet Take 25-50 mg by mouth nightly. cholecalciferol, vitamin D3, (VITAMIN D3 PO) Take  by mouth daily. !! - Potential duplicate medications found. Please discuss with provider.             NOTIFY YOUR PHYSICIAN FOR ANY OF THE FOLLOWING:   Fever over 101 degrees for 24 hours. Chest pain, shortness of breath, fever, chills, nausea, vomiting, diarrhea, change in mentation, falling, weakness, bleeding. Severe pain or pain not relieved by medications. Or, any other signs or symptoms that you may have questions about. DISPOSITION:  x  Home With:   OT  PT  HH  RN       Long term SNF/Inpatient Rehab    Independent/assisted living    Hospice    Other:       PATIENT CONDITION AT DISCHARGE:     Functional status    Poor     Deconditioned    x Independent      Cognition   x  Lucid     Forgetful     Dementia      Catheters/lines (plus indication)    Romero     PICC     PEG    x None      Code status   x  Full code     DNR      PHYSICAL EXAMINATION AT DISCHARGE:  GENERAL:  Alert x3, awake, pleasant female, appears to be stated age. HEENT:  Pupils are equal and reactive to light. Dry mucous membranes. Tympanic membranes are clear. NECK:  Supple. CHEST:  Clear to auscultation bilaterally. COR:  S1 and S2 heard. ABDOMEN:  Soft, nontender, and nondistended. Bowel sounds were physiological.  EXTREMITIES:  No clubbing, no cyanosis, no edema. NEURO/PSYCH:  Pleasant mood and affect. SKIN:  The patient has Sjogren's and has a chronic rash           CHRONIC MEDICAL DIAGNOSES:  Problem List as of 3/13/2019 Date Reviewed: 9/9/2013          Codes Class Noted - Resolved    Back pain ICD-10-CM: M54.9  ICD-9-CM: 724.5  3/12/2019 - Present        [facility] ICD-10-CM: C50.919  ICD-9-CM: 174.9  5/17/2012 - Present    Overview Addendum 7/23/2012  5:29 PM by Teresa Mccormack MD     6/12 BILATERAL mastectomy/reconstruction for RIGHT mixed ductal/lobular, grade 2, 4.5cm mixed ductal and lobular carcinoma, +1/4 nodes, %.  %, Ki 8%, HER 2 neg, Oncotype 26, intermediate                   Greater than 30 minutes were spent with the patient on counseling and coordination of care    Signed:   Beto Tavera DO  3/13/2019  4:04 PM